# Patient Record
Sex: MALE | Race: OTHER | URBAN - METROPOLITAN AREA
[De-identification: names, ages, dates, MRNs, and addresses within clinical notes are randomized per-mention and may not be internally consistent; named-entity substitution may affect disease eponyms.]

---

## 2023-11-27 ENCOUNTER — OFFICE VISIT (OUTPATIENT)
Dept: OTOLARYNGOLOGY | Facility: CLINIC | Age: 78
End: 2023-11-27

## 2023-11-27 DIAGNOSIS — K21.9 LARYNGOPHARYNGEAL REFLUX (LPR): Primary | ICD-10-CM

## 2023-11-27 DIAGNOSIS — R13.14 PHARYNGOESOPHAGEAL DYSPHAGIA: ICD-10-CM

## 2023-11-27 RX ORDER — METFORMIN HYDROCHLORIDE 500 MG/1
TABLET, EXTENDED RELEASE ORAL
COMMUNITY
Start: 2023-10-26

## 2023-11-27 RX ORDER — AMLODIPINE BESYLATE 10 MG/1
TABLET ORAL
COMMUNITY
Start: 2023-10-24

## 2023-11-27 RX ORDER — PREDNISONE 10 MG/1
TABLET ORAL
COMMUNITY
Start: 2023-11-17

## 2023-11-27 RX ORDER — LEVOTHYROXINE SODIUM 88 UG/1
88 TABLET ORAL DAILY
COMMUNITY

## 2023-11-27 RX ORDER — BETAMETHASONE DIPROPIONATE 0.5 MG/G
CREAM TOPICAL
COMMUNITY
Start: 2023-10-18

## 2023-11-27 RX ORDER — DOXYCYCLINE HYCLATE 100 MG
100 TABLET ORAL 2 TIMES DAILY
COMMUNITY
Start: 2023-11-11

## 2023-11-27 RX ORDER — GLIPIZIDE 10 MG/1
TABLET ORAL
COMMUNITY
Start: 2023-10-10

## 2023-11-27 RX ORDER — CETIRIZINE HYDROCHLORIDE 10 MG/1
TABLET ORAL
COMMUNITY
Start: 2023-09-27

## 2023-11-27 RX ORDER — ATORVASTATIN CALCIUM 20 MG/1
20 TABLET, FILM COATED ORAL DAILY
COMMUNITY

## 2023-11-27 RX ORDER — OMEPRAZOLE 40 MG/1
40 CAPSULE, DELAYED RELEASE ORAL DAILY
Qty: 30 CAPSULE | Refills: 2 | Status: SHIPPED | OUTPATIENT
Start: 2023-11-27

## 2023-11-27 RX ORDER — METHYLPREDNISOLONE 4 MG/1
TABLET ORAL
COMMUNITY
Start: 2023-10-02

## 2023-11-27 NOTE — PROGRESS NOTES
[place]  OTOLARYNGOLOGY - HEAD & NECK SURGERY    11/27/2023     Reason for Consultation:   Swallowing issues    History of Present Illness:   Patient is a pleasant 70year old male who is being seen for swallowing problems over the last 3 months. He does get tired easily but he doesn't have any difficulty breathing. His pain with swallowing is worse with cold food. In the morning he does have dry throat making it difficulty to swallow. Throughout the day his swallowing gets better. He is constantly clearing. He does have acid reflux and takes omeprazole. He drinks decaf in the morning. He also has 1 cup in the afternoon. He does not eat spicy food. He does get up in the night at times and eat. He has had thyroid cancer in the past and had surgery and radiation more than 20 years ago. Past Medical History  No past medical history on file. Past Surgical History  No past surgical history on file. Family History  No family history on file. Social History  Pediatric History   Patient Parents    Not on file     Other Topics Concern    Not on file   Social History Narrative    Not on file           Current Medications:  No current outpatient medications on file. Allergies  Not on File    Review of Systems:   A comprehensive 10 point review of systems was completed. Pertinent positives and negatives noted in the the HPI. Physical Exam:   There were no vitals taken for this visit.     GENERAL: No acute distress, Comfortable appearing  FACE: HB 1/6, Normal Animation  HEAD: Normocephalic  EYES: EOMI, pupils equil  EARS: Bilateral Auricles Symmetric   NOSE: Nares patent bilaterally  ORAL CAVITY: Tongue mobile, Oropharynx clear, Floor of mouth clear, Posterior oropharynx normal  NECK: No palpable lymphadenopathy, thyroid not palpable, nontender      Results:     Laboratory Data:  No results found for: \"WBC\", \"HGB\", \"HCT\", \"PLT\", \"CREATSERUM\", \"BUN\", \"NA\", \"K\", \"CL\", \"CO2\", \"GLU\", \"CA\", \"ALB\", \"ALKPHO\", \"TP\", \"AST\", \"ALT\", \"PTT\", \"INR\", \"PTP\", \"T4F\", \"TSH\", \"TSHREFLEX\", \"KIT\", \"LIP\", \"GGT\", \"PSA\", \"DDIMER\", \"ESRML\", \"ESRPF\", \"CRP\", \"BNP\", \"MG\", \"PHOS\", \"TROP\", \"CK\", \"CKMB\", \"JILLIAN\", \"RPR\", \"B12\", \"ETOH\", \"POCGLU\"      Imaging:  No results found. Impression:   Laryngopharyngeal reflux  Dysphagia  History of thyroid cancer s/p total thyroidectomy and bilateral neck dissections as well as post op radiation and chemotherapy    Recommendations:  Flexible laryngoscopy was attempted multiple times however patient has strong gag reflux. He does have risk factors for laryngopharyngeal reflux and his symptoms are most consistent with this  I will have him get an esophagram as well  I will increase his omeprazole to 40mg daily  I told him his eating late at night will make his symptoms significantly worse and he will try to change this  Follow up after esophagram.    Thank you for allowing me to participate in the care of your patient. Sam Shea DO   Otolaryngology/Rhinology, Sinus, and Endoscopic Skull Base Surgery  [facility]   [address] [facility]  Phone [phone]  Fax [phone]  11/27/2023  12:48 PM  11/27/2023

## 2023-11-29 ENCOUNTER — HOSPITAL ENCOUNTER (OUTPATIENT)
Dept: GENERAL RADIOLOGY | Facility: HOSPITAL | Age: 78
Discharge: HOME | End: 2023-11-29
Attending: STUDENT IN AN ORGANIZED HEALTH CARE EDUCATION/TRAINING PROGRAM
Payer: MEDICARE

## 2023-11-29 DIAGNOSIS — R13.14 PHARYNGOESOPHAGEAL DYSPHAGIA: ICD-10-CM

## 2023-11-29 DIAGNOSIS — K21.9 LARYNGOPHARYNGEAL REFLUX (LPR): ICD-10-CM

## 2023-11-29 PROCEDURE — 74246 X-RAY XM UPR GI TRC 2CNTRST: CPT | Performed by: STUDENT IN AN ORGANIZED HEALTH CARE EDUCATION/TRAINING PROGRAM

## 2025-02-10 NOTE — TELEPHONE ENCOUNTER
Left message to reschedule appointment due to Dr. Clayton feeling unwell. RN left message via voicemail and [email or web address].      used.

## 2025-02-12 NOTE — TELEPHONE ENCOUNTER
Patient has an appt tomorrow with Dr. Álvarez. I did leave his daughter a voicemail a few hours ago to take him to an urgent care or ED if his condition worsened.

## 2025-02-13 NOTE — TELEPHONE ENCOUNTER
Can he swallow ok.  Probably the best if he can is aleve.  If not, I would try liquid motrin.  Message left for the patient.

## 2025-02-14 NOTE — PATIENT INSTRUCTIONS
There was a mass that was visualized by fiberoptic exam.  I believe it is off of the right area epiglottic fold.  I think this is what is causing your symptoms.  On review of your old records, there was a history of cancer in the past.    A CT scan was ordered to better evaluate the area.

## 2025-02-14 NOTE — PROGRESS NOTES
Haseeb Dolan is a 79 year old male.   Chief Complaint   Patient presents with    Swallowing Problem     Swallowing issues    New Patient     Coughing up bloody mucus     HPI:   Patient here has been cough bloody mucus.  Is also having unintentional weight loss.  He has a history of a thyroidectomy.  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 Pt underwent endoscopy, surgery including endoscopy, tumor rsxn, b/l neck dissection and pharyngectomy with post op adjuvant RT. patient now with unintentional weight loss and hemoptysis    Current Outpatient Medications   Medication Sig Dispense Refill    Omeprazole 40 MG Oral Capsule Delayed Release Take 1 capsule (40 mg total) by mouth daily. Before a meal 30 capsule 2    amLODIPine 10 MG Oral Tab       atorvastatin 20 MG Oral Tab Take 1 tablet (20 mg total) by mouth daily.      cetirizine 10 MG Oral Tab TOME DIANE TABLETA POR V A ORAL DOS VECES AL D A      Doxycycline Hyclate 100 MG Oral Tab Take 1 tablet (100 mg total) by mouth 2 (two) times daily.      glipiZIDE 10 MG Oral Tab       metFORMIN  MG Oral Tablet 24 Hr       methylPREDNISolone 4 MG Oral Tablet Therapy Pack TOME 6 TABLETAS EL PRIMER D A SEG N LO INDICA EL PAQUETE, Y REDUZCA 1 TABLETA CADA D A POR 6 D AS      levothyroxine 88 MCG Oral Tab Take 1 tablet (88 mcg total) by mouth daily.      Betamethasone Dipropionate Aug 0.05 % External Cream APLIQUE A LAS AREAS AFECTADAS DE LOS BRAZOS Y TRONCO CADA 12 HORAS      predniSONE 10 MG Oral Tab         History reviewed. No pertinent past medical history.   Social History:  Social History     Socioeconomic History    Marital status:    Tobacco Use    Smoking status: Former     Types: Cigarettes    Smokeless tobacco: Never   Vaping Use    Vaping status: Never Used   Substance and Sexual Activity    Drug use: Never     Social Drivers of Health      Received from [facility]    Housing Stability         REVIEW OF SYSTEMS:   GENERAL HEALTH: feels well otherwise  GENERAL : denies fever, chills, sweats, weight loss, weight gain  SKIN: denies any unusual skin lesions or rashes  RESPIRATORY: denies shortness of breath with exertion  NEURO: denies headaches    EXAM:   There were no vitals taken for this visit.  System Details   Skin Inspection - Normal.   Constitutional Overall appearance - Normal.   Head/Face Facial features - Normal. Eyebrows - Normal. Skull - Normal.  Face looks somewhat edematous.   Eyes Conjunctiva - Right: Normal, Left: Normal. Pupil - Right: Normal, Left: Normal.    Ears Inspection - Right: Normal, Left: Normal.   Canal - Right: Normal, Left: Normal.    TM - Right: Normal, Left: Normal.   Nasal External nose - Normal.   Nasal septum -bilateral septal deviation  Turbinates - Normal   Oral/Oropharynx Lips - Normal, Tonsils - Normal, Tongue - Normal    Neck Exam Inspection - Normal. Palpation - Normal. Parotid gland - Normal. Thyroid gland - Normal.  Many changes to the skin in the neck   Lymph Detail Submental. Submandibular. Anterior cervical. Posterior cervical. Supraclavicular.  All without enlargement   Psychiatric Orientation - Oriented to time, place, person & situation. Appropriate mood and affect.   Neurological Memory - Normal. Cranial nerves - Cranial nerves II through XII grossly intact.   Nasopharynx Normal by fiberoptic exam   Larynx Consent was obtained for the procedure.  The nose was asesthetized with 1% neosynephrine and 4% lidocaine topical drops.    The scope was placed into the bilateral nares as well as the nasopharynx, hypopharynx and larynx.    All of which were examined.  The  entire larynx including the vallecula, epiglottis, true and false vocal cords, aryepiglottic folds, piriform sinuses and post cricord area were examined for tumors and infectious processes as well as for evidence of reflux and retained secretions.  Vocal cord mobility was also assessed.    Any  abnormalities are noted in the exam section.  [place] clearly a mass is seen on the right posterior cricoid area.  Possibly off the area epiglottic fold.  Difficult to assess vocal cord mobility although no stridor.     ASSESSMENT AND PLAN:   1. Laryngeal mass  As above in the postcricoid region.  Possibly off the right aryepiglottic fold.  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  He is status post tumor resection as well as postop radiation therapy at that point in time 1997.  - CT SOFT TISSUE OF NECK(CONTRAST ONLY) (CPT=70491); Future    2. Pharyngoesophageal dysphagia  Very likely secondary to the mass involved.  A CT scan was ordered to better evaluate the extent of the disease.    3. Unintentional weight loss  As this is in the postcricoid area, it is very likely causing his swallowing difficulties and unintentional weight loss.      The patient indicates understanding of these issues and agrees to the plan.      Adilia Álvarez MD  2/13/2025  8:11 PM

## 2025-02-14 NOTE — TELEPHONE ENCOUNTER
It certainly is concerning for a malignancy.  It explains a lot about the skin changes.  I think it was too long ago for this to be a recurrence.    Message sent to the patient.

## 2025-02-25 NOTE — PROGRESS NOTES
Haseeb Dolan is a 79 year old male.   Chief Complaint   Patient presents with    Results     Discuss ct scan results     HPI:   Patient here brought a disc of his CT from [facility].    Current Outpatient Medications   Medication Sig Dispense Refill    Omeprazole 40 MG Oral Capsule Delayed Release Take 1 capsule (40 mg total) by mouth daily. Before a meal 30 capsule 2    amLODIPine 10 MG Oral Tab       atorvastatin 20 MG Oral Tab Take 1 tablet (20 mg total) by mouth daily.      cetirizine 10 MG Oral Tab TOME DIANE TABLETA POR V A ORAL DOS VECES AL D A      Doxycycline Hyclate 100 MG Oral Tab Take 1 tablet (100 mg total) by mouth 2 (two) times daily.      glipiZIDE 10 MG Oral Tab       metFORMIN  MG Oral Tablet 24 Hr       methylPREDNISolone 4 MG Oral Tablet Therapy Pack TOME 6 TABLETAS EL PRIMER D A SEG N LO INDICA EL PAQUETE, Y REDUZCA 1 TABLETA CADA D A POR 6 D AS      levothyroxine 88 MCG Oral Tab Take 1 tablet (88 mcg total) by mouth daily.      Betamethasone Dipropionate Aug 0.05 % External Cream APLIQUE A LAS AREAS AFECTADAS DE [place] BRAZOS Y TRONCO CADA 12 HORAS      predniSONE 10 MG Oral Tab         No past medical history on file.   Social History:  Social History     Socioeconomic History    Marital status:    Tobacco Use    Smoking status: Former     Types: Cigarettes    Smokeless tobacco: Never   Vaping Use    Vaping status: Never Used   Substance and Sexual Activity    Drug use: Never     Social Drivers of Health      Received from [facility]    Housing Stability        REVIEW OF SYSTEMS:   GENERAL HEALTH: feels well otherwise  GENERAL : denies fever, chills, sweats, weight loss, weight gain  SKIN: denies any unusual skin lesions or rashes  RESPIRATORY: denies shortness of breath with exertion  NEURO: denies headaches    EXAM:   There were no vitals taken for this visit.  System Details   Skin Inspection - Normal.   Constitutional Overall appearance - Normal.    Head/Face Facial features - Normal. Eyebrows - Normal. Skull - Normal.   Eyes Conjunctiva - Right: Normal, Left: Normal. Pupil - Right: Normal, Left: Normal.    Ears Inspection - Right: Normal, Left: Normal.      Nasal External nose - Normal.      Oral/Oropharynx Lips - Normal   Neck Exam Scarring consistent with past history of bilateral neck dissections and radiation therapy.   Lymph Detail Submental. Submandibular. Anterior cervical. Posterior cervical. Supraclavicular all without enlargement   Psychiatric Orientation - Oriented to time, place, person & situation. Appropriate mood and affect.   Neurological Memory - Normal. Cranial nerves - Cranial nerves II through XII grossly intact.     ASSESSMENT AND PLAN:   1. Laryngeal mass  We reviewed the actual CT scan which showed a mass emanating from the left area epiglottic fold and involving the supraglottis.  Does not appear to be involving the true vocal cords or subglottic region.  Patient has a history of a left lateral base of tongue and lateral pharyngeal wall tumor which was staged as a T2 N0 M0 in about 1997.  It was treated by endoscopic tumor resection bilateral neck dissection pharyngotomy and postop adjuvant radiation therapy.  This was done at [facility].  There is a new mass which is highly concerning for malignancy.  I have asked the patient to follow-up with Dr. Blaine Bird for resection, possible tracheostomy tube placement.  All of this was discussed with the patient and his daughter at the time of the visit.    2. Unintentional weight loss  No stridor at rest but progressive dysphagia.      The patient indicates understanding of these issues and agrees to the plan.      Adilia Álvarez MD  2/24/2025  8:07 PM

## 2025-02-25 NOTE — PATIENT INSTRUCTIONS
Reviewed your CT scan together.  You do have an obvious supraglottic mass.  I have asked you to see Dr. Lianet Gonzalez for definitive therapy.